# Patient Record
Sex: FEMALE | Race: WHITE | NOT HISPANIC OR LATINO | Employment: OTHER | URBAN - METROPOLITAN AREA
[De-identification: names, ages, dates, MRNs, and addresses within clinical notes are randomized per-mention and may not be internally consistent; named-entity substitution may affect disease eponyms.]

---

## 2017-05-22 ENCOUNTER — APPOINTMENT (OUTPATIENT)
Dept: RADIOLOGY | Facility: MEDICAL CENTER | Age: 42
End: 2017-05-22
Attending: EMERGENCY MEDICINE
Payer: MEDICARE

## 2017-05-22 ENCOUNTER — HOSPITAL ENCOUNTER (EMERGENCY)
Facility: MEDICAL CENTER | Age: 42
End: 2017-05-23
Attending: EMERGENCY MEDICINE
Payer: MEDICARE

## 2017-05-22 DIAGNOSIS — S60.211A CONTUSION OF RIGHT WRIST, INITIAL ENCOUNTER: ICD-10-CM

## 2017-05-22 PROCEDURE — 700102 HCHG RX REV CODE 250 W/ 637 OVERRIDE(OP): Performed by: EMERGENCY MEDICINE

## 2017-05-22 PROCEDURE — 99284 EMERGENCY DEPT VISIT MOD MDM: CPT

## 2017-05-22 PROCEDURE — 73110 X-RAY EXAM OF WRIST: CPT | Mod: RT

## 2017-05-22 PROCEDURE — A9270 NON-COVERED ITEM OR SERVICE: HCPCS | Performed by: EMERGENCY MEDICINE

## 2017-05-22 RX ORDER — OXYCODONE HYDROCHLORIDE AND ACETAMINOPHEN 5; 325 MG/1; MG/1
1 TABLET ORAL ONCE
Status: COMPLETED | OUTPATIENT
Start: 2017-05-22 | End: 2017-05-22

## 2017-05-22 RX ADMIN — OXYCODONE HYDROCHLORIDE AND ACETAMINOPHEN 1 TABLET: 5; 325 TABLET ORAL at 23:25

## 2017-05-22 ASSESSMENT — PAIN SCALES - GENERAL: PAINLEVEL_OUTOF10: 9

## 2017-05-22 NOTE — ED AVS SNAPSHOT
5/22/2017    Deidra Goncalves  [address]  [facility] [address]    Dear Deidra:    [facility] wants to ensure your discharge home is safe and you or your loved ones have had all of your questions answered regarding your care after you leave the hospital.    Below is a list of resources and contact information should you have any questions regarding your hospital stay, follow-up instructions, or active medical symptoms.    Questions or Concerns Regarding… Contact   Medical Questions Related to Your Discharge  (7 days a week, 8am-5pm) Contact a Nurse Care Coordinator   [phone]   Medical Questions Not Related to Your Discharge  (24 hours a day / 7 days a week)  Contact the Nurse Health Line   [phone]    Medications or Discharge Instructions Refer to your discharge packet   or contact your [facility] Primary Care Provider   [phone]   Follow-up Appointment(s) Schedule your appointment via videoNEXT   or contact Scheduling [phone]   Billing Review your statement via videoNEXT  or contact Billing [phone]   Medical Records Review your records via videoNEXT   or contact Medical Records [phone]     You may receive a telephone call within two days of discharge. This call is to make certain you understand your discharge instructions and have the opportunity to have any questions answered. You can also easily access your medical information, test results and upcoming appointments via the videoNEXT free online health management tool. You can learn more and sign up at [employer]/videoNEXT. For assistance setting up your videoNEXT account, please call [phone].    Once again, we want to ensure your discharge home is safe and that you have a clear understanding of any next steps in your care. If you have any questions or concerns, please do not hesitate to contact us, we are here for you. Thank you for choosing [facility] for your healthcare needs.    Sincerely,    Your [facility] Healthcare Team

## 2017-05-22 NOTE — ED AVS SNAPSHOT
[employer] Access Code: [email or web address]  Expires: 6/21/2017 11:57 PM    Your email address is not on file at [facility].  Email Addresses are required for you to sign up for [employer], please contact [phone] to verify your personal information and to provide your email address prior to attempting to register for [employer].    Deidra Goncalves  [address]  [place] [address]    [employer]  A secure, online tool to manage your health information     [employer]’s [employer]® is a secure, online tool that connects you to your personalized health information from the privacy of your home -- day or night - making it very easy for you to manage your healthcare. Once the activation process is completed, you can even access your medical information using the [employer] nicola, which is available for free in the Apple Nicola store or Google Play store.     To learn more about [employer], visit [email or web address]    There are two levels of access available (as shown below):   My Chart Features  [facility] Primary Care Doctor [facility]  Specialists [facility]  Urgent  Care Non-[facility] Primary Care Doctor   Email your healthcare team securely and privately 24/7 X X X    Manage appointments: schedule your next appointment; view details of past/upcoming appointments X      Request prescription refills. X      View recent personal medical records, including lab and immunizations X X X X   View health record, including health history, allergies, medications X X X X   Read reports about your outpatient visits, procedures, consult and ER notes X X X X   See your discharge summary, which is a recap of your hospital and/or ER visit that includes your diagnosis, lab results, and care plan X X  X     How to register for amiandot:  Once your e-mail address has been verified, follow the following steps to sign up for [employer]ot.     1. Go to  [email or web address]  2. Click on the Sign Up Now box, which takes you to the New Member Sign Up  page. You will need to provide the following information:  a. Enter your [employer] Access Code exactly as it appears at the top of this page. (You will not need to use this code after you’ve completed the sign-up process. If you do not sign up before the expiration date, you must request a new code.)   b. Enter your date of birth.   c. Enter your home email address.   d. Click Submit, and follow the next screen’s instructions.  3. Create a [employer] ID. This will be your [employer] login ID and cannot be changed, so think of one that is secure and easy to remember.  4. Create a [employer] password. You can change your password at any time.  5. Enter your Password Reset Question and Answer. This can be used at a later time if you forget your password.   6. Enter your e-mail address. This allows you to receive e-mail notifications when new information is available in [employer].  7. Click Sign Up. You can now view your health information.    For assistance activating your [employer] account, call [phone]

## 2017-05-22 NOTE — ED AVS SNAPSHOT
Home Care Instructions                                                                                                                Deidra Goncalves   MRN: [id number]    Department:  [facility], Emergency Dept   Date of Visit:  5/22/2017            [facility], Emergency Dept    [address]    [place] [address]    Phone:  [phone]      You were seen by     Segundo Coronado M.D.      Your Diagnosis Was     Contusion of right wrist, initial encounter     S60.211A       These are the medications you received during your hospitalization from 05/22/2017 2056 to 05/22/2017 2357     Date/Time Order Dose Route Action    05/22/2017 2320 oxycodone-acetaminophen (PERCOCET) 5-325 MG per tablet 1 Tab 1 Tab Oral Given      Follow-up Information     1. Follow up with [facility] In 3 days.    Contact information    [place] [address] [phone]        Medication Information     Review all of your home medications and newly ordered medications with your primary doctor and/or pharmacist as soon as possible. Follow medication instructions as directed by your doctor and/or pharmacist.     Please keep your complete medication list with you and share with your physician. Update the information when medications are discontinued, doses are changed, or new medications (including over-the-counter products) are added; and carry medication information at all times in the event of emergency situations.               Medication List      ASK your doctor about these medications        Instructions    Morning Afternoon Evening Bedtime    albuterol 2.5 mg/0.5 mL Nebu   Commonly known as:  PROVENTIL        Inhale  by mouth.                        carbamazepine 200 MG Tabs   Commonly known as:  TEGRETOL        Take 200 mg by mouth 2 Times a Day.   Dose:  200 mg                        duloxetine 60 MG Cpep delayed-release capsule   Commonly known as:  CYMBALTA        Take 60 mg by mouth every  day.   Dose:  60 mg                        folic acid 1 MG Tabs   Commonly known as:  FOLVITE        Take 1 mg by mouth every day.   Dose:  1 mg                        gabapentin 300 MG Caps   Commonly known as:  NEURONTIN        Take 800 mg by mouth 3 times a day. Indications: Fibromyalgia Syndrome   Dose:  800 mg                        ibuprofen 800 MG Tabs   Commonly known as:  MOTRIN        Take 800 mg by mouth every 8 hours as needed.   Dose:  800 mg                        MULTIVITAMIN PO        Take  by mouth.                        NEXIUM 40 MG delayed-release capsule   Generic drug:  esomeprazole        Take 40 mg by mouth every morning before breakfast.   Dose:  40 mg                        omeprazole 20 MG delayed-release capsule   Commonly known as:  PRILOSEC        Take 60 mg by mouth every day.   Dose:  60 mg                        sertraline 100 MG Tabs   Commonly known as:  ZOLOFT        Take 200 mg by mouth every day.   Dose:  200 mg                        VOLTAREN 1 % Gel   Generic drug:  Diclofenac Sodium        Apply  to skin as directed.                                Procedures and tests performed during your visit     DX-WRIST-COMPLETE 3+ RIGHT        Discharge Instructions       Contusion  A contusion is a deep bruise. Contusions happen when an injury causes bleeding under the skin. Signs of bruising include pain, puffiness (swelling), and discolored skin. The contusion may turn blue, purple, or yellow.  HOME CARE   · Put ice on the injured area.  ¨ Put ice in a plastic bag.  ¨ Place a towel between your skin and the bag.  ¨ Leave the ice on for 15-20 minutes, 03-04 times a day.  · Only take medicine as told by your doctor.  · Rest the injured area.  · If possible, raise (elevate) the injured area to lessen puffiness.  GET HELP RIGHT AWAY IF:   · You have more bruising or puffiness.  · You have pain that is getting worse.  · Your puffiness or pain is not helped by medicine.  MAKE SURE YOU:    · Understand these instructions.  · Will watch your condition.  · Will get help right away if you are not doing well or get worse.     This information is not intended to replace advice given to you by your health care provider. Make sure you discuss any questions you have with your health care provider.     Document Released: 06/05/2009 Document Revised: 03/11/2013 Document Reviewed: 10/22/2012  [employer] Interactive Patient Education ©2016 [employer]            Patient Information     Patient Information    Following emergency treatment: all patient requiring follow-up care must return either to a private physician or a clinic if your condition worsens before you are able to obtain further medical attention, please return to the emergency room.     Billing Information    At [facility], we work to make the billing process streamlined for our patients.  Our Representatives are here to answer any questions you may have regarding your hospital bill.  If you have insurance coverage and have supplied your insurance information to us, we will submit a claim to your insurer on your behalf.  Should you have any questions regarding your bill, we can be reached online or by phone as follows:  Online: You are able pay your bills online or live chat with our representatives about any billing questions you may have. We are here to help Monday - Friday from 8:00am to 7:30pm and 9:00am - 12:00pm on Saturdays.  Please visit [email or web address]  for more information.   Phone:  [phone] or [phone]    Please note that your emergency physician, surgeon, pathologist, radiologist, anesthesiologist, and other specialists are not employed by [facility] and will therefore bill separately for their services.  Please contact them directly for any questions concerning their bills at the numbers below:     Emergency Physician Services:  [phone]  [facility]:   [phone]  [facility]:  [phone]  [facility]:  [phone]    1. Your final bill may vary from the amount quoted upon discharge if all procedures are not complete at that time, or if your doctor has additional procedures of which we are not aware. You will receive an additional bill if you return to the Emergency Department at [facility] for suture removal regardless of the facility of which the sutures were placed.     2. Please arrange for settlement of this account at the emergency registration.    3. All self-pay accounts are due in full at the time of treatment.  If you are unable to meet this obligation then payment is expected within 4-5 days.     4. If you have had radiology studies (CT, X-ray, Ultrasound, MRI), you have received a preliminary result during your emergency department visit. Please contact the radiology department [phone] to receive a copy of your final result. Please discuss the Final result with your primary physician or with the follow up physician provided.     Crisis Hotline:  [place] Crisis Hotline:  2-697-WIXLWLN or 1-836.463.3158  [place] Crisis Hotline:    1-897.268.1882 or 815-312-1500         ED Discharge Follow Up Questions    1. In order to provide you with very good care, we would like to follow up with a phone call in the next few days.  May we have your permission to contact you?     YES /  NO    2. What is the best phone number to call you? (       )_____-__________    3. What is the best time to call you?      Morning  /  Afternoon  /  Evening                   Patient Signature:  ____________________________________________________________    Date:  ____________________________________________________________

## 2017-05-23 VITALS
BODY MASS INDEX: 27.9 KG/M2 | WEIGHT: 184.08 LBS | OXYGEN SATURATION: 98 % | DIASTOLIC BLOOD PRESSURE: 55 MMHG | HEART RATE: 89 BPM | HEIGHT: 68 IN | TEMPERATURE: 98.8 F | SYSTOLIC BLOOD PRESSURE: 117 MMHG | RESPIRATION RATE: 16 BRPM

## 2017-05-23 ASSESSMENT — PAIN SCALES - GENERAL: PAINLEVEL_OUTOF10: 5

## 2017-05-23 NOTE — DISCHARGE INSTRUCTIONS
Contusion  A contusion is a deep bruise. Contusions happen when an injury causes bleeding under the skin. Signs of bruising include pain, puffiness (swelling), and discolored skin. The contusion may turn blue, purple, or yellow.  HOME CARE   · Put ice on the injured area.  ¨ Put ice in a plastic bag.  ¨ Place a towel between your skin and the bag.  ¨ Leave the ice on for 15-20 minutes, 03-04 times a day.  · Only take medicine as told by your doctor.  · Rest the injured area.  · If possible, raise (elevate) the injured area to lessen puffiness.  GET HELP RIGHT AWAY IF:   · You have more bruising or puffiness.  · You have pain that is getting worse.  · Your puffiness or pain is not helped by medicine.  MAKE SURE YOU:   · Understand these instructions.  · Will watch your condition.  · Will get help right away if you are not doing well or get worse.     This information is not intended to replace advice given to you by your health care provider. Make sure you discuss any questions you have with your health care provider.     Document Released: 06/05/2009 Document Revised: 03/11/2013 Document Reviewed: 10/22/2012  [employer] Interactive Patient Education ©2016 [employer] Inc.

## 2017-05-23 NOTE — ED PROVIDER NOTES
"ED Provider Note    CHIEF COMPLAINT  Chief Complaint   Patient presents with   • Wrist Injury     right wrist \"slammed\" on table 12 days ago. Pt able to move fingers, denies n/t, pain radiates up to elbow.       HPI  Deidra Goncalves is a 42 y.o. female who presents to the emergency department with right wrist discomfort. The patient states she slammed her forearm down on a table approximately 12 days ago and she's had significant discomfort around the distal ulnar aspect of her wrist. She did have x-rays performed at [facility] right after the event that was negative. She has continued discomfort despite treatment. She says she does have some pain that rates up into the elbow. She does not have any loss of function of the hand but does have severe pain with flexion and extension at the wrist.    REVIEW OF SYSTEMS  Recent fevers    PHYSICAL EXAM  VITAL SIGNS: /49 mmHg  Pulse 94  Temp(Src) 37.1 °C (98.8 °F)  Resp 18  Ht 1.727 m (5' 8\")  Wt 83.5 kg (184 lb 1.4 oz)  BMI 28.00 kg/m2  SpO2 99%  In general the patient does not appear toxic  Extremities patient has soft tissue swelling and tenderness to the ulnar aspect of the right wrist. She does have some surrounding ecchymosis. She does not have any obvious skeletal deformities. She has full flexion and extension at the wrist as well as the MCP and IP joints. She has a normal elbow examination.  Skin ecchymosis to the ulnar aspect of the wrist as described above  Neurovascular examination is grossly intact to the right upper extremity    RADIOLOGY/PROCEDURES  DX-WRIST-COMPLETE 3+ RIGHT   Final Result      1.  Soft tissue swelling at the dorsal and ulnar aspect of RIGHT wrist with possible soft tissue gas and bony erosion at the base of 5th metacarpal, concerning for infectious and/or inflammatory arthropathy.   2.  No fracture or dislocation.            COURSE & MEDICAL DECISION MAKING  Pertinent Labs & Imaging studies reviewed. (See chart for " details)  This a 42-year-old female who presents to emergency department with right wrist discomfort. I do not appreciate any evidence of an infectious source. I suspect the x-ray does show acute inflammation. She has not responded to anti-inflammatories and therefore place the patient in a wrist splint and have her follow-up with orthopedics in 48-72 hours.    FINAL IMPRESSION  1. Right wrist contusion       Disposition  The patient will be discharged in stable condition      Electronically signed by: Segundo Coronado, 5/22/2017 10:55 PM

## 2017-05-23 NOTE — ED NOTES
Initial injury 12 days ago, ace wrap removed from RUE c/o pain to R hand/wrist, no obvious deformity, no swelling noted, bruising noted to hand/wrist,  CMS intact, radial pulses 2+ bilat, assessment as charted.

## 2017-05-23 NOTE — ED NOTES
"Patient ambulatory to triage:  Chief Complaint   Patient presents with   • Wrist Injury     right wrist \"slammed\" on table 12 days ago. Pt able to move fingers, denies n/t, pain radiates up to elbow.     Constant throbbing pain to right wrist, radiating to elbow.      Explained wait time and triage process to pt. Pt placed back out in lobby, told to notify ED tech or triage RN of any changes.       "

## 2017-05-23 NOTE — ED NOTES
Patient appropriate for discharge per ERP. Discussed discharge instructions, home care, and follow up with patient. Patient verbalized understanding. No distress noted.   Family providing ride home.

## 2019-09-08 ENCOUNTER — HOSPITAL ENCOUNTER (OUTPATIENT)
Facility: MEDICAL CENTER | Age: 44
DRG: 872 | End: 2019-09-08
Admitting: FAMILY MEDICINE
Payer: MEDICARE

## 2019-09-08 ENCOUNTER — ANESTHESIA (OUTPATIENT)
Dept: SURGERY | Facility: MEDICAL CENTER | Age: 44
DRG: 872 | End: 2019-09-08
Payer: MEDICARE

## 2019-09-08 ENCOUNTER — APPOINTMENT (OUTPATIENT)
Dept: RADIOLOGY | Facility: MEDICAL CENTER | Age: 44
DRG: 872 | End: 2019-09-08
Attending: UROLOGY
Payer: MEDICARE

## 2019-09-08 ENCOUNTER — HOSPITAL ENCOUNTER (INPATIENT)
Facility: MEDICAL CENTER | Age: 44
LOS: 1 days | DRG: 872 | End: 2019-09-09
Attending: FAMILY MEDICINE | Admitting: FAMILY MEDICINE
Payer: MEDICARE

## 2019-09-08 ENCOUNTER — ANESTHESIA EVENT (OUTPATIENT)
Dept: SURGERY | Facility: MEDICAL CENTER | Age: 44
DRG: 872 | End: 2019-09-08
Payer: MEDICARE

## 2019-09-08 PROBLEM — R78.81 GRAM-NEGATIVE BACTEREMIA: Status: ACTIVE | Noted: 2019-09-08

## 2019-09-08 PROBLEM — F99 PSYCHIATRIC DISORDER: Status: ACTIVE | Noted: 2019-09-08

## 2019-09-08 PROBLEM — Z72.0 TOBACCO USE: Status: ACTIVE | Noted: 2019-09-08

## 2019-09-08 PROBLEM — F19.10 POLYSUBSTANCE ABUSE (HCC): Status: ACTIVE | Noted: 2019-09-08

## 2019-09-08 PROBLEM — N12 PYELONEPHRITIS: Status: ACTIVE | Noted: 2019-09-08

## 2019-09-08 PROBLEM — Z78.9 ALCOHOL USE: Status: ACTIVE | Noted: 2019-09-08

## 2019-09-08 PROBLEM — E87.6 HYPOKALEMIA: Status: ACTIVE | Noted: 2019-09-08

## 2019-09-08 PROBLEM — R56.9 SEIZURE (HCC): Status: ACTIVE | Noted: 2019-09-08

## 2019-09-08 PROBLEM — N13.2 URETERAL STONE WITH HYDRONEPHROSIS: Status: ACTIVE | Noted: 2019-09-08

## 2019-09-08 PROBLEM — A41.9 SEPSIS (HCC): Status: ACTIVE | Noted: 2019-09-08

## 2019-09-08 LAB
ANION GAP SERPL CALC-SCNC: 10 MMOL/L (ref 0–11.9)
BUN SERPL-MCNC: 14 MG/DL (ref 8–22)
CALCIUM SERPL-MCNC: 7.9 MG/DL (ref 8.5–10.5)
CHLORIDE SERPL-SCNC: 106 MMOL/L (ref 96–112)
CO2 SERPL-SCNC: 21 MMOL/L (ref 20–33)
CREAT SERPL-MCNC: 0.78 MG/DL (ref 0.5–1.4)
GLUCOSE SERPL-MCNC: 159 MG/DL (ref 65–99)
INR PPP: 1.34 (ref 0.87–1.13)
LACTATE BLD-SCNC: 1.7 MMOL/L (ref 0.5–2)
LACTATE BLD-SCNC: 2.1 MMOL/L (ref 0.5–2)
LACTATE BLD-SCNC: 2.7 MMOL/L (ref 0.5–2)
MAGNESIUM SERPL-MCNC: 1.8 MG/DL (ref 1.5–2.5)
PHOSPHATE SERPL-MCNC: 2.6 MG/DL (ref 2.5–4.5)
POTASSIUM SERPL-SCNC: 3.3 MMOL/L (ref 3.6–5.5)
PROTHROMBIN TIME: 16.9 SEC (ref 12–14.6)
SODIUM SERPL-SCNC: 137 MMOL/L (ref 135–145)

## 2019-09-08 PROCEDURE — 500879 HCHG PACK, CYSTO: Performed by: UROLOGY

## 2019-09-08 PROCEDURE — 160036 HCHG PACU - EA ADDL 30 MINS PHASE I: Performed by: UROLOGY

## 2019-09-08 PROCEDURE — 700117 HCHG RX CONTRAST REV CODE 255: Performed by: UROLOGY

## 2019-09-08 PROCEDURE — 700111 HCHG RX REV CODE 636 W/ 250 OVERRIDE (IP): Performed by: ANESTHESIOLOGY

## 2019-09-08 PROCEDURE — 85027 COMPLETE CBC AUTOMATED: CPT

## 2019-09-08 PROCEDURE — 83735 ASSAY OF MAGNESIUM: CPT

## 2019-09-08 PROCEDURE — 85610 PROTHROMBIN TIME: CPT

## 2019-09-08 PROCEDURE — 85007 BL SMEAR W/DIFF WBC COUNT: CPT

## 2019-09-08 PROCEDURE — 160002 HCHG RECOVERY MINUTES (STAT): Performed by: UROLOGY

## 2019-09-08 PROCEDURE — 0TJ58ZZ INSPECTION OF KIDNEY, VIA NATURAL OR ARTIFICIAL OPENING ENDOSCOPIC: ICD-10-PCS | Performed by: UROLOGY

## 2019-09-08 PROCEDURE — 160048 HCHG OR STATISTICAL LEVEL 1-5: Performed by: UROLOGY

## 2019-09-08 PROCEDURE — 700111 HCHG RX REV CODE 636 W/ 250 OVERRIDE (IP): Performed by: FAMILY MEDICINE

## 2019-09-08 PROCEDURE — 99223 1ST HOSP IP/OBS HIGH 75: CPT | Mod: AI | Performed by: FAMILY MEDICINE

## 2019-09-08 PROCEDURE — 84100 ASSAY OF PHOSPHORUS: CPT

## 2019-09-08 PROCEDURE — 770001 HCHG ROOM/CARE - MED/SURG/GYN PRIV*

## 2019-09-08 PROCEDURE — 160039 HCHG SURGERY MINUTES - EA ADDL 1 MIN LEVEL 3: Performed by: UROLOGY

## 2019-09-08 PROCEDURE — 700101 HCHG RX REV CODE 250: Performed by: ANESTHESIOLOGY

## 2019-09-08 PROCEDURE — 160035 HCHG PACU - 1ST 60 MINS PHASE I: Performed by: UROLOGY

## 2019-09-08 PROCEDURE — 160028 HCHG SURGERY MINUTES - 1ST 30 MINS LEVEL 3: Performed by: UROLOGY

## 2019-09-08 PROCEDURE — C1758 CATHETER, URETERAL: HCPCS | Performed by: UROLOGY

## 2019-09-08 PROCEDURE — 501329 HCHG SET, CYSTO IRRIG Y TUR: Performed by: UROLOGY

## 2019-09-08 PROCEDURE — 36415 COLL VENOUS BLD VENIPUNCTURE: CPT

## 2019-09-08 PROCEDURE — 700102 HCHG RX REV CODE 250 W/ 637 OVERRIDE(OP): Performed by: FAMILY MEDICINE

## 2019-09-08 PROCEDURE — 80048 BASIC METABOLIC PNL TOTAL CA: CPT | Mod: 91

## 2019-09-08 PROCEDURE — 700105 HCHG RX REV CODE 258: Performed by: FAMILY MEDICINE

## 2019-09-08 PROCEDURE — 160009 HCHG ANES TIME/MIN: Performed by: UROLOGY

## 2019-09-08 PROCEDURE — 83605 ASSAY OF LACTIC ACID: CPT

## 2019-09-08 PROCEDURE — A9270 NON-COVERED ITEM OR SERVICE: HCPCS | Performed by: FAMILY MEDICINE

## 2019-09-08 RX ORDER — SODIUM CHLORIDE, SODIUM LACTATE, POTASSIUM CHLORIDE, AND CALCIUM CHLORIDE .6; .31; .03; .02 G/100ML; G/100ML; G/100ML; G/100ML
30 INJECTION, SOLUTION INTRAVENOUS
Status: DISCONTINUED | OUTPATIENT
Start: 2019-09-08 | End: 2019-09-10 | Stop reason: HOSPADM

## 2019-09-08 RX ORDER — KETOROLAC TROMETHAMINE 30 MG/ML
30 INJECTION, SOLUTION INTRAMUSCULAR; INTRAVENOUS EVERY 6 HOURS PRN
Status: DISCONTINUED | OUTPATIENT
Start: 2019-09-08 | End: 2019-09-10 | Stop reason: HOSPADM

## 2019-09-08 RX ORDER — HALOPERIDOL 5 MG/ML
1 INJECTION INTRAMUSCULAR
Status: DISCONTINUED | OUTPATIENT
Start: 2019-09-08 | End: 2019-09-08 | Stop reason: HOSPADM

## 2019-09-08 RX ORDER — SODIUM CHLORIDE 9 MG/ML
INJECTION, SOLUTION INTRAVENOUS
Status: COMPLETED
Start: 2019-09-08 | End: 2019-09-08

## 2019-09-08 RX ORDER — BUPROPION HYDROCHLORIDE 150 MG/1
150 TABLET, EXTENDED RELEASE ORAL 2 TIMES DAILY
Status: DISCONTINUED | OUTPATIENT
Start: 2019-09-08 | End: 2019-09-10 | Stop reason: HOSPADM

## 2019-09-08 RX ORDER — ACETAMINOPHEN 325 MG/1
650 TABLET ORAL EVERY 6 HOURS PRN
Status: DISCONTINUED | OUTPATIENT
Start: 2019-09-08 | End: 2019-09-10 | Stop reason: HOSPADM

## 2019-09-08 RX ORDER — ONDANSETRON 2 MG/ML
INJECTION INTRAMUSCULAR; INTRAVENOUS PRN
Status: DISCONTINUED | OUTPATIENT
Start: 2019-09-08 | End: 2019-09-08 | Stop reason: SURG

## 2019-09-08 RX ORDER — GABAPENTIN 400 MG/1
800 CAPSULE ORAL 3 TIMES DAILY
Status: DISCONTINUED | OUTPATIENT
Start: 2019-09-08 | End: 2019-09-10 | Stop reason: HOSPADM

## 2019-09-08 RX ORDER — MORPHINE SULFATE 4 MG/ML
2-4 INJECTION, SOLUTION INTRAMUSCULAR; INTRAVENOUS
Status: DISCONTINUED | OUTPATIENT
Start: 2019-09-08 | End: 2019-09-08

## 2019-09-08 RX ORDER — HYDROMORPHONE HYDROCHLORIDE 1 MG/ML
0.2 INJECTION, SOLUTION INTRAMUSCULAR; INTRAVENOUS; SUBCUTANEOUS
Status: DISCONTINUED | OUTPATIENT
Start: 2019-09-08 | End: 2019-09-08 | Stop reason: HOSPADM

## 2019-09-08 RX ORDER — LIDOCAINE HYDROCHLORIDE 20 MG/ML
INJECTION, SOLUTION EPIDURAL; INFILTRATION; INTRACAUDAL; PERINEURAL PRN
Status: DISCONTINUED | OUTPATIENT
Start: 2019-09-08 | End: 2019-09-08 | Stop reason: SURG

## 2019-09-08 RX ORDER — MIDAZOLAM HYDROCHLORIDE 1 MG/ML
INJECTION INTRAMUSCULAR; INTRAVENOUS PRN
Status: DISCONTINUED | OUTPATIENT
Start: 2019-09-08 | End: 2019-09-08 | Stop reason: SURG

## 2019-09-08 RX ORDER — BISACODYL 10 MG
10 SUPPOSITORY, RECTAL RECTAL
Status: DISCONTINUED | OUTPATIENT
Start: 2019-09-08 | End: 2019-09-10 | Stop reason: HOSPADM

## 2019-09-08 RX ORDER — ALBUTEROL SULFATE 90 UG/1
1-2 AEROSOL, METERED RESPIRATORY (INHALATION) EVERY 4 HOURS PRN
Status: DISCONTINUED | OUTPATIENT
Start: 2019-09-08 | End: 2019-09-10 | Stop reason: HOSPADM

## 2019-09-08 RX ORDER — MIDAZOLAM HYDROCHLORIDE 1 MG/ML
1 INJECTION INTRAMUSCULAR; INTRAVENOUS
Status: DISCONTINUED | OUTPATIENT
Start: 2019-09-08 | End: 2019-09-08 | Stop reason: HOSPADM

## 2019-09-08 RX ORDER — LABETALOL HYDROCHLORIDE 5 MG/ML
5 INJECTION, SOLUTION INTRAVENOUS
Status: DISCONTINUED | OUTPATIENT
Start: 2019-09-08 | End: 2019-09-08 | Stop reason: HOSPADM

## 2019-09-08 RX ORDER — OXYCODONE HYDROCHLORIDE 5 MG/1
5-10 TABLET ORAL EVERY 4 HOURS PRN
Status: DISCONTINUED | OUTPATIENT
Start: 2019-09-08 | End: 2019-09-08

## 2019-09-08 RX ORDER — SODIUM CHLORIDE, SODIUM LACTATE, POTASSIUM CHLORIDE, CALCIUM CHLORIDE 600; 310; 30; 20 MG/100ML; MG/100ML; MG/100ML; MG/100ML
INJECTION, SOLUTION INTRAVENOUS CONTINUOUS
Status: DISCONTINUED | OUTPATIENT
Start: 2019-09-08 | End: 2019-09-10 | Stop reason: HOSPADM

## 2019-09-08 RX ORDER — ONDANSETRON 2 MG/ML
4 INJECTION INTRAMUSCULAR; INTRAVENOUS
Status: DISCONTINUED | OUTPATIENT
Start: 2019-09-08 | End: 2019-09-08 | Stop reason: HOSPADM

## 2019-09-08 RX ORDER — HYDROMORPHONE HYDROCHLORIDE 1 MG/ML
0.1 INJECTION, SOLUTION INTRAMUSCULAR; INTRAVENOUS; SUBCUTANEOUS
Status: DISCONTINUED | OUTPATIENT
Start: 2019-09-08 | End: 2019-09-08 | Stop reason: HOSPADM

## 2019-09-08 RX ORDER — OXYCODONE HCL 5 MG/5 ML
10 SOLUTION, ORAL ORAL
Status: DISCONTINUED | OUTPATIENT
Start: 2019-09-08 | End: 2019-09-08 | Stop reason: HOSPADM

## 2019-09-08 RX ORDER — DIPHENHYDRAMINE HYDROCHLORIDE 50 MG/ML
12.5 INJECTION INTRAMUSCULAR; INTRAVENOUS
Status: DISCONTINUED | OUTPATIENT
Start: 2019-09-08 | End: 2019-09-08 | Stop reason: HOSPADM

## 2019-09-08 RX ORDER — POLYETHYLENE GLYCOL 3350 17 G/17G
1 POWDER, FOR SOLUTION ORAL
Status: DISCONTINUED | OUTPATIENT
Start: 2019-09-08 | End: 2019-09-10 | Stop reason: HOSPADM

## 2019-09-08 RX ORDER — IPRATROPIUM BROMIDE AND ALBUTEROL SULFATE 2.5; .5 MG/3ML; MG/3ML
3 SOLUTION RESPIRATORY (INHALATION)
Status: DISCONTINUED | OUTPATIENT
Start: 2019-09-08 | End: 2019-09-08 | Stop reason: HOSPADM

## 2019-09-08 RX ORDER — DEXAMETHASONE SODIUM PHOSPHATE 4 MG/ML
INJECTION, SOLUTION INTRA-ARTICULAR; INTRALESIONAL; INTRAMUSCULAR; INTRAVENOUS; SOFT TISSUE PRN
Status: DISCONTINUED | OUTPATIENT
Start: 2019-09-08 | End: 2019-09-08 | Stop reason: SURG

## 2019-09-08 RX ORDER — OXYCODONE HCL 5 MG/5 ML
5 SOLUTION, ORAL ORAL
Status: DISCONTINUED | OUTPATIENT
Start: 2019-09-08 | End: 2019-09-08 | Stop reason: HOSPADM

## 2019-09-08 RX ORDER — HYDRALAZINE HYDROCHLORIDE 20 MG/ML
5 INJECTION INTRAMUSCULAR; INTRAVENOUS
Status: DISCONTINUED | OUTPATIENT
Start: 2019-09-08 | End: 2019-09-08 | Stop reason: HOSPADM

## 2019-09-08 RX ORDER — SODIUM CHLORIDE, SODIUM LACTATE, POTASSIUM CHLORIDE, CALCIUM CHLORIDE 600; 310; 30; 20 MG/100ML; MG/100ML; MG/100ML; MG/100ML
INJECTION, SOLUTION INTRAVENOUS CONTINUOUS
Status: DISCONTINUED | OUTPATIENT
Start: 2019-09-08 | End: 2019-09-08 | Stop reason: HOSPADM

## 2019-09-08 RX ORDER — CEFAZOLIN SODIUM 1 G/3ML
INJECTION, POWDER, FOR SOLUTION INTRAMUSCULAR; INTRAVENOUS PRN
Status: DISCONTINUED | OUTPATIENT
Start: 2019-09-08 | End: 2019-09-08 | Stop reason: SURG

## 2019-09-08 RX ORDER — CARBAMAZEPINE 200 MG/1
200 TABLET ORAL 2 TIMES DAILY
Status: DISCONTINUED | OUTPATIENT
Start: 2019-09-08 | End: 2019-09-10 | Stop reason: HOSPADM

## 2019-09-08 RX ORDER — AMOXICILLIN 250 MG
2 CAPSULE ORAL 2 TIMES DAILY
Status: DISCONTINUED | OUTPATIENT
Start: 2019-09-08 | End: 2019-09-10 | Stop reason: HOSPADM

## 2019-09-08 RX ORDER — HYDROMORPHONE HYDROCHLORIDE 1 MG/ML
0.4 INJECTION, SOLUTION INTRAMUSCULAR; INTRAVENOUS; SUBCUTANEOUS
Status: DISCONTINUED | OUTPATIENT
Start: 2019-09-08 | End: 2019-09-08 | Stop reason: HOSPADM

## 2019-09-08 RX ORDER — MEPERIDINE HYDROCHLORIDE 25 MG/ML
12.5 INJECTION INTRAMUSCULAR; INTRAVENOUS; SUBCUTANEOUS
Status: DISCONTINUED | OUTPATIENT
Start: 2019-09-08 | End: 2019-09-08 | Stop reason: HOSPADM

## 2019-09-08 RX ADMIN — CEFTRIAXONE SODIUM 2 G: 2 INJECTION, POWDER, FOR SOLUTION INTRAMUSCULAR; INTRAVENOUS at 17:56

## 2019-09-08 RX ADMIN — ONDANSETRON 4 MG: 2 INJECTION INTRAMUSCULAR; INTRAVENOUS at 14:30

## 2019-09-08 RX ADMIN — GABAPENTIN 800 MG: 400 CAPSULE ORAL at 18:44

## 2019-09-08 RX ADMIN — CARBAMAZEPINE 200 MG: 200 TABLET ORAL at 18:44

## 2019-09-08 RX ADMIN — DEXAMETHASONE SODIUM PHOSPHATE 8 MG: 4 INJECTION, SOLUTION INTRA-ARTICULAR; INTRALESIONAL; INTRAMUSCULAR; INTRAVENOUS; SOFT TISSUE at 14:30

## 2019-09-08 RX ADMIN — PROPOFOL 120 MG: 10 INJECTION, EMULSION INTRAVENOUS at 14:30

## 2019-09-08 RX ADMIN — MIDAZOLAM 2 MG: 1 INJECTION INTRAMUSCULAR; INTRAVENOUS at 14:23

## 2019-09-08 RX ADMIN — FENTANYL CITRATE 50 MCG: 50 INJECTION, SOLUTION INTRAMUSCULAR; INTRAVENOUS at 14:27

## 2019-09-08 RX ADMIN — CEFAZOLIN 2 G: 330 INJECTION, POWDER, FOR SOLUTION INTRAMUSCULAR; INTRAVENOUS at 14:30

## 2019-09-08 RX ADMIN — SENNOSIDES, DOCUSATE SODIUM 2 TABLET: 50; 8.6 TABLET, FILM COATED ORAL at 18:43

## 2019-09-08 RX ADMIN — SODIUM CHLORIDE, POTASSIUM CHLORIDE, SODIUM LACTATE AND CALCIUM CHLORIDE: 600; 310; 30; 20 INJECTION, SOLUTION INTRAVENOUS at 14:23

## 2019-09-08 RX ADMIN — KETOROLAC TROMETHAMINE 30 MG: 30 INJECTION, SOLUTION INTRAMUSCULAR at 12:51

## 2019-09-08 RX ADMIN — GABAPENTIN 800 MG: 400 CAPSULE ORAL at 12:10

## 2019-09-08 RX ADMIN — FENTANYL CITRATE 50 MCG: 50 INJECTION, SOLUTION INTRAMUSCULAR; INTRAVENOUS at 14:51

## 2019-09-08 RX ADMIN — SODIUM CHLORIDE, POTASSIUM CHLORIDE, SODIUM LACTATE AND CALCIUM CHLORIDE: 600; 310; 30; 20 INJECTION, SOLUTION INTRAVENOUS at 17:56

## 2019-09-08 RX ADMIN — SODIUM CHLORIDE, POTASSIUM CHLORIDE, SODIUM LACTATE AND CALCIUM CHLORIDE: 600; 310; 30; 20 INJECTION, SOLUTION INTRAVENOUS at 12:10

## 2019-09-08 RX ADMIN — BUPROPION HYDROCHLORIDE 150 MG: 150 TABLET, EXTENDED RELEASE ORAL at 18:44

## 2019-09-08 RX ADMIN — LIDOCAINE HYDROCHLORIDE 5 ML: 20 INJECTION, SOLUTION EPIDURAL; INFILTRATION; INTRACAUDAL at 14:30

## 2019-09-08 ASSESSMENT — ENCOUNTER SYMPTOMS
COUGH: 0
NECK PAIN: 0
NAUSEA: 1
FEVER: 0
FLANK PAIN: 1
HEARTBURN: 0
DIZZINESS: 0
BACK PAIN: 0
DIARRHEA: 0
SHORTNESS OF BREATH: 0
BLURRED VISION: 0
CHILLS: 1
NERVOUS/ANXIOUS: 1
DIAPHORESIS: 0
PALPITATIONS: 0
SORE THROAT: 0
VOMITING: 0
HEADACHES: 0
ABDOMINAL PAIN: 0

## 2019-09-08 ASSESSMENT — LIFESTYLE VARIABLES
TOTAL SCORE: 0
ALCOHOL_USE: NO
ON A TYPICAL DAY WHEN YOU DRINK ALCOHOL HOW MANY DRINKS DO YOU HAVE: 0
HAVE PEOPLE ANNOYED YOU BY CRITICIZING YOUR DRINKING: NO
EVER HAD A DRINK FIRST THING IN THE MORNING TO STEADY YOUR NERVES TO GET RID OF A HANGOVER: NO
EVER FELT BAD OR GUILTY ABOUT YOUR DRINKING: NO
AVERAGE NUMBER OF DAYS PER WEEK YOU HAVE A DRINK CONTAINING ALCOHOL: 0
TOTAL SCORE: 0
EVER_SMOKED: YES
HOW MANY TIMES IN THE PAST YEAR HAVE YOU HAD 5 OR MORE DRINKS IN A DAY: 0
TOTAL SCORE: 0
CONSUMPTION TOTAL: NEGATIVE
DOES PATIENT WANT TO STOP DRINKING: NO
HAVE YOU EVER FELT YOU SHOULD CUT DOWN ON YOUR DRINKING: NO

## 2019-09-08 ASSESSMENT — COGNITIVE AND FUNCTIONAL STATUS - GENERAL
MOBILITY SCORE: 23
DAILY ACTIVITIY SCORE: 24
CLIMB 3 TO 5 STEPS WITH RAILING: A LITTLE
SUGGESTED CMS G CODE MODIFIER MOBILITY: CI
SUGGESTED CMS G CODE MODIFIER DAILY ACTIVITY: CH

## 2019-09-08 ASSESSMENT — PATIENT HEALTH QUESTIONNAIRE - PHQ9
2. FEELING DOWN, DEPRESSED, IRRITABLE, OR HOPELESS: NOT AT ALL
SUM OF ALL RESPONSES TO PHQ9 QUESTIONS 1 AND 2: 0
1. LITTLE INTEREST OR PLEASURE IN DOING THINGS: NOT AT ALL

## 2019-09-08 ASSESSMENT — PAIN SCALES - GENERAL: PAIN_LEVEL: 2

## 2019-09-08 NOTE — OP REPORT
OPERATIVE NOTE:      Date: 9/8/2019    Patient ID:   Name:             Deidra Go   YOB: 1975  Age:                 44 y.o.  female   MRN:               [id number]                                                                         PRE-OP DIAGNOSIS: 4mm distal left stone        POST-OP DIAGNOSIS: no remaining stone            PROCEDURE: Procedure(s) and Anesthesia Type:     * CYSTOSCOPY,  * URETEROSCOPY             SURGEON: Surgeon(s) and Role:     * Fransisco Reece M.D. - Primary            ANESTHESIA: get            ESTIMATED BLOOD LOSS: none            DRAINS: none                  SPECIMENS: none               COMPLICATIONS: none                   CONDITION: stabl            TECHNIQUE:  The patient was brought to the operating room. Given general anesthesia. Prepped and draped in the usual sterile fashion. Cystoscopy was carried out. Left ureteral orifices identified. Sensor wire passed through the orifice to the kidney. 6.9 Uzbek rigid ureteroscope was passed alongside the wire. No stone was identified. Contrast is used to opacify the proximal system. No filling defect is seen. Over the wire then passed a flexible scope. All 3 calyceal groups are viewed with no stones found. Ureter then examined all the way to bladder with no stone. No left usual stent is placed. Cystoscopy  was then performed. She was sent to recovery in stable condition.

## 2019-09-08 NOTE — ANESTHESIA PROCEDURE NOTES
Airway  Date/Time: 9/8/2019 2:31 PM  Performed by: Mert Arana M.D.  Authorized by: Mert Arana M.D.     Location:  OR  Urgency:  Elective  Difficult Airway: No    Indications for Airway Management:  Anesthesia  Spontaneous Ventilation: absent    Sedation Level:  Deep  Preoxygenated: Yes    Mask Difficulty Assessment:  1 - vent by mask  Final Airway Type:  Supraglottic airway  Final Supraglottic Airway:  Standard LMA  SGA Size:  4  Number of Attempts at Approach:  1

## 2019-09-08 NOTE — OR NURSING
Patient sleeping; aroused spontaneously; cooperative. O2Sat 94% on room air, VSS. Unable to locate Pt's boyfriend at this time.

## 2019-09-08 NOTE — ANESTHESIA TIME REPORT
Anesthesia Start and Stop Event Times     Date Time Event    9/8/2019 1410 Ready for Procedure     1423 Anesthesia Start     1458 Anesthesia Stop        Responsible Staff  09/08/19    Name Role Begin End    Mert Arana M.D. Anesth 1423 1458        Preop Diagnosis (Free Text):  Pre-op Diagnosis     kidney stones        Preop Diagnosis (Codes):    Post op Diagnosis  Renal stones      Premium Reason  E. Weekend    Comments:

## 2019-09-08 NOTE — OR NURSING
Pt to pre-op. Pre-op complete. RNs Jeanine and Beth assisting to prep pt for surgery. Pt pleasant and cooperative.   1340- Dr. Reece at bedside speaking with patient, Beth RN at bedside while Dr. Reece in room.

## 2019-09-08 NOTE — PROGRESS NOTES
"1100: Patient arrived to T7-2 via medical transport. Patient introduced to this RN, ALTAGRACIA May and AYLIN Mcrae at bedside. Patient alert and oriented but lethargic at times. Patient escorted to restroom via this RN and ALTAGRACIA May. Patient assisted per pt request to help change into renown gown, pants and yellow socks. All patients belongings (1 pair blue jeans, 1 pair black leggings with marijuana leafs, grey soft winter boots, and 2 mis matched socks.) placed in belongings bag and placed at bedside per pt request. Patient escorted back to bed. Vitals taken at this time. Patient introduced to floor. Fall precautions in place, safety maintained, bed alarm on and call light within reach.     1105: MD Nina paged to notify patient has arrived.     1115: This RN at bedside with AYLIN Mcrae attempting to complete admission. During admission process patient given information about floor policies and because of non private room patient unable to have visitors during 8pm to 5am per hospital policy. At this time patient became agitated and aggressive stating \"why the hell did they transfer me here then, I want to be transferred to [place], I need a private room, can't I just ask my neighbor/roomate if its okay?, He's homeless (referring to her boyfriend), We don't even have our car to stay in. At this time this RN apologized, and explained there are no private rooms available at this time per charge RN Earnestine and DIVINA Lee. This RN also explained that we can not medically transfer because we have proper urologist on staff to treat patient so there is not a medical need. This RN offered for a list of homeless shelters in the area to patient for boyfriend. Patient stated \"F**k No!, He will not go to a shelter, they're n**ers there! They stole all of our stuff! [place] has really turned into a s**t place.\" This RN then explained she is welcome to leave AMA as well but we can not make exceptions to this policy. This RN then " "stated she will talk to charge RN Earnestine again and will try to contact case management to help.     1125: MD Nina at bedside, with AYLIN Mcrae.     1145: This RN at bedside with charge ALTAGRACIA Colby. Charge RN Earnestine attempted to find a private bed again but unable at this time. Charge RN Earnestine gave patient [facility] number per pt request. Charge RN also reiterated hospital policy. Patient stated \"I will make a decision in a little while.\" This RN reiterated need for patient to be hospitalized. MD Nina notified.    1200:  Shaye notified of situation to see if she could assist patient/ patients boyfriend. Shaye mentioned we can only provide a list of shelters at this time.     1320: Patient transferred to OR via transport at this time.    1550: Report called the [facility] T4 RN at this time, All belongings walked down to room T402 at this time and handed to floor RN.           "

## 2019-09-08 NOTE — PROGRESS NOTES
Transfer from [facility], Sepsis, Pyelonephritis, Ureterolithiasis, Urology [facility] consulted

## 2019-09-08 NOTE — ASSESSMENT & PLAN NOTE
Urology consulted POD 1  CYSTOSCOPY,  * URETEROSCOPY   On IV ceftriaxone  Follow culture  Pain control with toradol

## 2019-09-08 NOTE — ANESTHESIA POSTPROCEDURE EVALUATION
Patient: Deidra Go    Procedure Summary     Date:  09/08/19 Room / Location:  [place] [address] / SURGERY [facility]    Anesthesia Start:  1423 Anesthesia Stop:  1458    Procedures:       CYSTOSCOPY (Urethra)      URETEROSCOPY (Left Ureter) Diagnosis:  (kidney stones)    Surgeon:  Fransisco Reece M.D. Responsible Provider:  Mert Arana M.D.    Anesthesia Type:  general ASA Status:  3 - Emergent          Final Anesthesia Type: general  Last vitals  BP   Blood Pressure: (!) 96/69    Temp   36.9 °C (98.4 °F)    Pulse   Pulse: 95   Resp   15    SpO2   97 %      Anesthesia Post Evaluation    Patient location during evaluation: PACU  Patient participation: complete - patient participated  Level of consciousness: awake and alert  Pain score: 2    Airway patency: patent  Anesthetic complications: no  Cardiovascular status: adequate  Respiratory status: acceptable  Hydration status: acceptable    PONV: none           Nurse Pain Score: 0 (NPRS)

## 2019-09-08 NOTE — ASSESSMENT & PLAN NOTE
This is sepsis (without associated acute organ dysfunction).  Source -pyelonephritis  Sepsis protocol  IV ceftriaxone  Follow culture

## 2019-09-08 NOTE — PROGRESS NOTES
2 RN skin check completed with Lalo FRIAS.   Devices in place None.  Skin assessed under devices N/A.  Confirmed pressure ulcers found on None.  New potential pressure ulcers noted on N/A. Wound consult placed N/A.  The following interventions in place Q2hr self turns, moistures, and pillows for positioning.

## 2019-09-08 NOTE — ANESTHESIA PREPROCEDURE EVALUATION
"44yoF with PMHx of substance abuse (meth/heroin) /ETOH, tobacco abuse, bipolar do presenting for sepsis due to ureteral stone    Ac-- pt says +N/V, prolonged wakeup, and \"flat lined once bc she wasn't taking her meds that she was supposed to and they thought she was\" - pt not sure what exactly happened    Allergies to PCN, trazadone    Appropriately NPO      Relevant Problems   NEURO   (+) Seizure (HCC)         (+) Ureteral stone with hydronephrosis       Physical Exam    Airway   Mallampati: I       Cardiovascular - normal exam     Dental - normal exam         Pulmonary - normal exam     Abdominal - normal exam     Neurological - normal exam                 Anesthesia Plan    ASA 3- EMERGENT   ASA physical status 3 criteria: alcohol and/or substance dependence or abuseASA physical status emergent criteria: compromised vital organ, limb or tissue    Plan - general       Airway plan will be LMA        Induction: intravenous    Postoperative Plan: Postoperative administration of opioids is intended.    Pertinent diagnostic labs and testing reviewed    Informed Consent:    Anesthetic plan and risks discussed with patient.        "

## 2019-09-08 NOTE — ANESTHESIA QCDR
2019 [facility] Clinical Data Registry (for Quality Improvement)     Postoperative nausea/vomiting risk protocol (Adult = 18 yrs and Pediatric 3-17 yrs)- (430 and 463)  General inhalation anesthetic (NOT TIVA) with PONV risk factors: Yes  Provision of anti-emetic therapy with at least 2 different classes of agents: Yes   Patient DID NOT receive anti-emetic therapy and reason is documented in Medical Record:  N/A    Multimodal Pain Management- (AQI59)  Patient undergoing Elective Surgery (i.e. Outpatient, or ASC, or Prescheduled Surgery prior to Hospital Admission): No  Use of Multimodal Pain Management, two or more drugs and/or interventions, NOT including systemic opioids: N/A  Exception: Documented allergy to multiple classes of analgesics: N/A    PACU assessment of acute postoperative pain prior to Anesthesia Care End- Applies to Patients Age = 18- (ABG7)  Initial PACU pain score is which of the following: < 7/10  Patient unable to report pain score: N/A    Post-anesthetic transfer of care checklist/protocol to PACU/ICU- (426 and 427)  Upon conclusion of case, patient transferred to which of the following locations: PACU/Non-ICU  Use of transfer checklist/protocol: Yes  Exclusion: Service Performed in Patient Hospital Room (and thus did not require transfer): N/A    PACU Reintubation- (AQI31)  General anesthesia requiring endotracheal intubation (ETT) along with subsequent extubation in OR or PACU: Yes  Required reintubation in the PACU: No   Extubation was a planned trial documented in the medical record prior to removal of the original airway device:  N/A    Unplanned admission to ICU related to anesthesia service up through end of PACU care- (MD51)  Unplanned admission to ICU (not initially anticipated at anesthesia start time): No

## 2019-09-08 NOTE — H&P
Hospital Medicine History & Physical Note    Date of Service  9/8/2019    Primary Care Physician  Pcp Pt States None    Consultants  Urology    Code Status  Full    Chief Complaint  Left flank pain    History of Presenting Illness  44 y.o. female who presented 9/8/2019 with left flank pain which started about 2 days ago.  She had associated symptoms of chills and nausea, she denies any vomiting or diarrhea or dysuria.  She did notice urinary frequency and dark urine.  She has had no fever.  Denies any chest pain palpitation shortness of breath diaphoresis.  She was seen outside facility were urinalysis shows urinary tract infection, CT scan showed a left ureterolithiasis with hydronephrosis.  Her white count was 19,000.  Her blood culture appears to be showing gram-negative rods.  She was told to be tachycardic.  Urology was consulted on the case. Denies any history of kidney stones in the past.  He was transferred here for urology consult.  I discussed the case with Urology on her arrival here.  History and exam was completely done with female staff present.    Review of Systems  Review of Systems   Constitutional: Positive for chills. Negative for diaphoresis, fever and malaise/fatigue.   HENT: Negative for hearing loss and sore throat.    Eyes: Negative for blurred vision.   Respiratory: Negative for cough and shortness of breath.    Cardiovascular: Negative for chest pain, palpitations and leg swelling.   Gastrointestinal: Positive for nausea. Negative for abdominal pain, diarrhea, heartburn and vomiting.   Genitourinary: Positive for flank pain and frequency. Negative for dysuria and hematuria.   Musculoskeletal: Negative for back pain and neck pain.   Skin: Negative for rash.   Neurological: Negative for dizziness and headaches.   Psychiatric/Behavioral: The patient is nervous/anxious.        Past Medical History   has a past medical history of Anesthesia, Arthritis, ASTHMA, Depression, Fall, GERD  (gastroesophageal reflux disease), Heart burn, Myocardial infarct (HCC), Pain, Psychiatric problem, Seizure (HCC), Seizure (HCC), Sleep apnea, Sleep apnea, Stroke (HCC), and Stroke (HCC). She also has no past medical history of Arrhythmia, Cancer (HCC), Cold, Dental disorder, Heart valve disease, Hepatitis A, Hepatitis B, Hepatitis C, Hypertension, Other specified symptom associated with female genital organs, Personal history of venous thrombosis and embolism, Pregnancy, or Unspecified hemorrhagic conditions.    Surgical History   has a past surgical history that includes other orthopedic surgery; other; pr  delivery only; and hysterectomy robotic (2013).     Family History  Family history is unknown by patient.     Social History   reports that she has been smoking cigarettes. She has a 5.00 pack-year smoking history. She has never used smokeless tobacco. She reports that she drinks about 3.5 oz of alcohol per week. She reports that she has current or past drug history. Drug: Marijuana.    Allergies  Allergies   Allergen Reactions   • Pcn [Penicillins] Shortness of Breath and Swelling   • Pcn [Penicillins]    • Trazodone Hives   • Trazodone      Hives         Medications  Prior to Admission Medications   Prescriptions Last Dose Informant Patient Reported? Taking?   Diclofenac Sodium (VOLTAREN) 1 % GEL   Yes No   Sig: Apply  to skin as directed.   albuterol (PROVENTIL) 2.5 mg/0.5 mL NEBU   Yes No   Sig: Inhale  by mouth.   albuterol 108 (90 Base) MCG/ACT Aero Soln inhalation aerosol   No No   Sig: Inhale 1-2 Puffs by mouth every four hours as needed for Shortness of Breath (wheeze, cough).   buPROPion (WELLBUTRIN XL) 150 MG XL tablet   No No   Sig: Take 1 Tab by mouth every morning.   buPROPion SR (WELLBUTRIN SR) 150 MG TABLET SR 12 HR sustained-release tablet   Yes No   Sig: Take 150 mg by mouth 2 times a day.   carBAMazepine (TEGRETOL) 200 MG Tab   No No   Sig: Take 1 Tab by mouth 2 Times a Day.    cefUROXime (CEFTIN) 500 MG Tab   No No   Sig: Take 1 Tab by mouth 2 times a day.   gabapentin (NEURONTIN) 300 MG Cap   No No   Sig: Take 3 Caps by mouth 3 times a day. Indications: Fibromyalgia Syndrome   ibuprofen (MOTRIN) 800 MG TABS   Yes No   Sig: Take 800 mg by mouth every 8 hours as needed.      Facility-Administered Medications: None       Physical Exam  Temp:  [37.2 °C (99 °F)] 37.2 °C (99 °F)  Pulse:  [112] 112  Resp:  [18] 18  BP: (111)/(68) 111/68  SpO2:  [100 %] 100 %    Physical Exam   Constitutional: She is oriented to person, place, and time. She appears well-developed and well-nourished.   HENT:   Head: Normocephalic and atraumatic.   Eyes: Pupils are equal, round, and reactive to light. Conjunctivae are normal.   Neck: No tracheal deviation present. No thyromegaly present.   Cardiovascular: Normal rate and regular rhythm.   Pulmonary/Chest: Effort normal and breath sounds normal.   Abdominal: Soft. Bowel sounds are normal. She exhibits no distension. There is tenderness (LLQ). There is no rebound and no guarding.   Genitourinary:   Genitourinary Comments: Left CVA tenderness   Musculoskeletal: She exhibits no edema.   Lymphadenopathy:     She has no cervical adenopathy.   Neurological: She is alert and oriented to person, place, and time.   Skin: Skin is warm and dry.   Nursing note and vitals reviewed.      Laboratory:  Recent Labs     09/07/19  2230   WBC 19.1*   RBC 4.81   HEMOGLOBIN 15.4   HEMATOCRIT 44.2   MCV 91.9   MCH 32.0   MCHC 34.8   RDW 12.1   PLATELETCT 383   MPV 9.3     Recent Labs     09/07/19  2230   SODIUM 134*   POTASSIUM 3.2*   CHLORIDE 99   CO2 25   GLUCOSE 114*   BUN 12   CREATININE 0.7   CALCIUM 9.0     Recent Labs     09/07/19  2230   ALTSGPT 19   ASTSGOT 10   ALKPHOSPHAT 90   TBILIRUBIN 1.0   LIPASE 40*   GLUCOSE 114*     Recent Labs     09/08/19  1230   INR 1.34*     No results for input(s): NTPROBNP in the last 72 hours.      No results for input(s): TROPONINT in the last  72 hours.    Urinalysis:    Recent Labs     09/08/19  0030   SPECGRAVITY <=1.005*   GLUCOSEUR NEGATIVE   KETONES TRACE*   NITRITE POSITIVE*   LEUKESTERAS SMALL*   WBCURINE 50-75*   RBCURINE 6-10   BACTERIA *   EPITHELCELL 3-5*        Imaging:  DX-CYSTO FLUORO > 1 HOUR    (Results Pending)         Assessment/Plan:  I anticipate this patient will require at least two midnights for appropriate medical management, necessitating inpatient admission.    * Pyelonephritis- (present on admission)  Assessment & Plan  IVF hydration  IV Rocephin  Follow culture    Gram-negative bacteremia- (present on admission)  Assessment & Plan  IV Rocephin  Follow culture    Ureteral stone with hydronephrosis- (present on admission)  Assessment & Plan  Urology consulted  IVF hydration  Pain control    Sepsis (HCC)- (present on admission)  Assessment & Plan  This is sepsis (without associated acute organ dysfunction).  Source -pyelonephritis    Seizure (HCC)- (present on admission)  Assessment & Plan  Continue carbamazepine and Neurontin    Alcohol use- (present on admission)  Assessment & Plan  Watch for withdrawal symptoms    Hypokalemia- (present on admission)  Assessment & Plan  Check BMP, magnesium, phosphorus    Polysubstance abuse (HCC)- (present on admission)  Assessment & Plan  Watch for withdrawal symptoms    Psychiatric disorder- (present on admission)  Assessment & Plan  Continue Wellbutrin    Tobacco use- (present on admission)  Assessment & Plan  Refused nicotine replacement protocol      VTE prophylaxis: SCD

## 2019-09-09 VITALS
HEIGHT: 68 IN | TEMPERATURE: 98.9 F | HEART RATE: 106 BPM | SYSTOLIC BLOOD PRESSURE: 124 MMHG | BODY MASS INDEX: 21.55 KG/M2 | DIASTOLIC BLOOD PRESSURE: 87 MMHG | RESPIRATION RATE: 18 BRPM | OXYGEN SATURATION: 99 % | WEIGHT: 142.2 LBS

## 2019-09-09 LAB
ANION GAP SERPL CALC-SCNC: 7 MMOL/L (ref 0–11.9)
BASOPHILS # BLD AUTO: 0 % (ref 0–1.8)
BASOPHILS # BLD: 0 K/UL (ref 0–0.12)
BUN SERPL-MCNC: 17 MG/DL (ref 8–22)
CALCIUM SERPL-MCNC: 8.1 MG/DL (ref 8.5–10.5)
CHLORIDE SERPL-SCNC: 108 MMOL/L (ref 96–112)
CO2 SERPL-SCNC: 22 MMOL/L (ref 20–33)
CREAT SERPL-MCNC: 0.72 MG/DL (ref 0.5–1.4)
EOSINOPHIL # BLD AUTO: 0 K/UL (ref 0–0.51)
EOSINOPHIL NFR BLD: 0 % (ref 0–6.9)
ERYTHROCYTE [DISTWIDTH] IN BLOOD BY AUTOMATED COUNT: 43.1 FL (ref 35.9–50)
GLUCOSE SERPL-MCNC: 147 MG/DL (ref 65–99)
HCT VFR BLD AUTO: 34.8 % (ref 37–47)
HGB BLD-MCNC: 11.8 G/DL (ref 12–16)
LYMPHOCYTES # BLD AUTO: 0.81 K/UL (ref 1–4.8)
LYMPHOCYTES NFR BLD: 4.3 % (ref 22–41)
MANUAL DIFF BLD: NORMAL
MCH RBC QN AUTO: 32.5 PG (ref 27–33)
MCHC RBC AUTO-ENTMCNC: 33.9 G/DL (ref 33.6–35)
MCV RBC AUTO: 95.9 FL (ref 81.4–97.8)
MONOCYTES # BLD AUTO: 0.17 K/UL (ref 0–0.85)
MONOCYTES NFR BLD AUTO: 0.9 % (ref 0–13.4)
MORPHOLOGY BLD-IMP: NORMAL
NEUTROPHILS # BLD AUTO: 17.92 K/UL (ref 2–7.15)
NEUTROPHILS NFR BLD: 88.7 % (ref 44–72)
NEUTS BAND NFR BLD MANUAL: 6.1 % (ref 0–10)
NRBC # BLD AUTO: 0 K/UL
NRBC BLD-RTO: 0 /100 WBC
PLATELET # BLD AUTO: 247 K/UL (ref 164–446)
PLATELET BLD QL SMEAR: NORMAL
PMV BLD AUTO: 9.9 FL (ref 9–12.9)
POTASSIUM SERPL-SCNC: 3.4 MMOL/L (ref 3.6–5.5)
RBC # BLD AUTO: 3.63 M/UL (ref 4.2–5.4)
RBC BLD AUTO: NORMAL
SODIUM SERPL-SCNC: 137 MMOL/L (ref 135–145)
WBC # BLD AUTO: 18.9 K/UL (ref 4.8–10.8)

## 2019-09-09 PROCEDURE — 700111 HCHG RX REV CODE 636 W/ 250 OVERRIDE (IP): Performed by: INTERNAL MEDICINE

## 2019-09-09 PROCEDURE — 99233 SBSQ HOSP IP/OBS HIGH 50: CPT | Performed by: INTERNAL MEDICINE

## 2019-09-09 PROCEDURE — A9270 NON-COVERED ITEM OR SERVICE: HCPCS | Performed by: INTERNAL MEDICINE

## 2019-09-09 PROCEDURE — 700102 HCHG RX REV CODE 250 W/ 637 OVERRIDE(OP): Performed by: FAMILY MEDICINE

## 2019-09-09 PROCEDURE — 94760 N-INVAS EAR/PLS OXIMETRY 1: CPT

## 2019-09-09 PROCEDURE — 700102 HCHG RX REV CODE 250 W/ 637 OVERRIDE(OP): Performed by: INTERNAL MEDICINE

## 2019-09-09 PROCEDURE — A9270 NON-COVERED ITEM OR SERVICE: HCPCS | Performed by: FAMILY MEDICINE

## 2019-09-09 PROCEDURE — 700105 HCHG RX REV CODE 258: Performed by: FAMILY MEDICINE

## 2019-09-09 PROCEDURE — 700111 HCHG RX REV CODE 636 W/ 250 OVERRIDE (IP): Performed by: FAMILY MEDICINE

## 2019-09-09 RX ORDER — TRAMADOL HYDROCHLORIDE 50 MG/1
50 TABLET ORAL EVERY 6 HOURS PRN
Status: DISCONTINUED | OUTPATIENT
Start: 2019-09-09 | End: 2019-09-10 | Stop reason: HOSPADM

## 2019-09-09 RX ADMIN — BUPROPION HYDROCHLORIDE 150 MG: 150 TABLET, EXTENDED RELEASE ORAL at 04:49

## 2019-09-09 RX ADMIN — TRAMADOL HYDROCHLORIDE 50 MG: 50 TABLET, FILM COATED ORAL at 17:29

## 2019-09-09 RX ADMIN — KETOROLAC TROMETHAMINE 30 MG: 30 INJECTION, SOLUTION INTRAMUSCULAR at 09:44

## 2019-09-09 RX ADMIN — GABAPENTIN 800 MG: 400 CAPSULE ORAL at 11:26

## 2019-09-09 RX ADMIN — TRAMADOL HYDROCHLORIDE 50 MG: 50 TABLET, FILM COATED ORAL at 11:26

## 2019-09-09 RX ADMIN — SENNOSIDES, DOCUSATE SODIUM 2 TABLET: 50; 8.6 TABLET, FILM COATED ORAL at 04:50

## 2019-09-09 RX ADMIN — ACETAMINOPHEN 650 MG: 325 TABLET, FILM COATED ORAL at 02:24

## 2019-09-09 RX ADMIN — GABAPENTIN 800 MG: 400 CAPSULE ORAL at 17:29

## 2019-09-09 RX ADMIN — BUPROPION HYDROCHLORIDE 150 MG: 150 TABLET, EXTENDED RELEASE ORAL at 17:29

## 2019-09-09 RX ADMIN — CARBAMAZEPINE 200 MG: 200 TABLET ORAL at 04:50

## 2019-09-09 RX ADMIN — CARBAMAZEPINE 200 MG: 200 TABLET ORAL at 17:29

## 2019-09-09 RX ADMIN — ENOXAPARIN SODIUM 40 MG: 100 INJECTION SUBCUTANEOUS at 11:26

## 2019-09-09 RX ADMIN — CEFTRIAXONE SODIUM 2 G: 2 INJECTION, POWDER, FOR SOLUTION INTRAMUSCULAR; INTRAVENOUS at 04:50

## 2019-09-09 RX ADMIN — GABAPENTIN 800 MG: 400 CAPSULE ORAL at 04:50

## 2019-09-09 RX ADMIN — SODIUM CHLORIDE, POTASSIUM CHLORIDE, SODIUM LACTATE AND CALCIUM CHLORIDE: 600; 310; 30; 20 INJECTION, SOLUTION INTRAVENOUS at 05:53

## 2019-09-09 ASSESSMENT — ENCOUNTER SYMPTOMS
FLANK PAIN: 1
MYALGIAS: 0
SEIZURES: 0
ORTHOPNEA: 0
NECK PAIN: 0
STRIDOR: 0
EYE REDNESS: 0
FOCAL WEAKNESS: 0
VOMITING: 0
HEADACHES: 0
INSOMNIA: 0
EYE PAIN: 0
WEIGHT LOSS: 0
EYE DISCHARGE: 0
SPUTUM PRODUCTION: 0
BLURRED VISION: 0
ABDOMINAL PAIN: 0
NERVOUS/ANXIOUS: 0
HEARTBURN: 0
NAUSEA: 0
PALPITATIONS: 0
DIARRHEA: 0
DIZZINESS: 0
COUGH: 0
SHORTNESS OF BREATH: 0
BACK PAIN: 0
DEPRESSION: 0
CHILLS: 0
FEVER: 0

## 2019-09-09 ASSESSMENT — COPD QUESTIONNAIRES
DO YOU EVER COUGH UP ANY MUCUS OR PHLEGM?: NO/ONLY WITH OCCASIONAL COLDS OR INFECTIONS
COPD SCREENING SCORE: 2
DURING THE PAST 4 WEEKS HOW MUCH DID YOU FEEL SHORT OF BREATH: NONE/LITTLE OF THE TIME
HAVE YOU SMOKED AT LEAST 100 CIGARETTES IN YOUR ENTIRE LIFE: YES

## 2019-09-09 ASSESSMENT — LIFESTYLE VARIABLES: EVER_SMOKED: YES

## 2019-09-09 NOTE — CARE PLAN
Problem: Communication  Goal: The ability to communicate needs accurately and effectively will improve  Outcome: PROGRESSING AS EXPECTED  Pt communicates needs appropriately      Problem: Infection  Goal: Will remain free from infection  Outcome: PROGRESSING AS EXPECTED  Pt's labs and VSS being monitored, IV abx running, standardized handwashing performed per hospital protocol

## 2019-09-09 NOTE — DISCHARGE PLANNING
Care Transition Team Assessment      Anticipated Discharge Disposition: Shelter    Action: RN CM assessed pt at bedside. Pt reports she is homeless, currently living in [place] area with her boyfriend. Pt has a mailing address of [place] where her mother and children are living.   Pt reports she is independent with all her ADLs and IADLs; she states she drives but her car does not have working tail lights so she cannot use it until they are fixed. Pt reports she will need assistance getting back to [place].   Pt states she has no PCP at this time. She has no prescription drug coverage.  Pt is upset because she states she has no clothing with her; she is unsure where her clothing is at this time.     Barriers to Discharge: Medical clearance    Plan: RN CM will assist with discharge planning needs upon receipt of medical clearance.       Information Source  Orientation : Oriented x 4  Information Given By: Patient  Who is responsible for making decisions for patient? : Patient    Readmission Evaluation  Is this a readmission?: No    Elopement Risk  Legal Hold: No  Ambulatory or Self Mobile in Wheelchair: Yes  Disoriented: No  Psychiatric Symptoms: None  History of Wandering: No  Elopement this Admit: No  Vocalizing Wanting to Leave: No  Displays Behaviors, Body Language Wanting to Leave: No-Not at Risk for Elopement  Elopement Risk: Not at Risk for Elopement    Interdisciplinary Discharge Planning  Does Admitting Nurse Feel This Could be a Complex Discharge?: Yes  Primary Care Physician: no PCP currently  Lives with - Patient's Self Care Capacity: Unrelated Adult  Patient or legal guardian wants to designate a caregiver (see row info): No  Support Systems: Spouse / Significant Other  Housing / Facility: Homeless  Do You Take your Prescribed Medications Regularly: No  Reasons Why Not Taking Medications : Financial Reasons  Able to Return to Previous ADL's: Yes  Mobility Issues: No  Prior Services:  None  Patient Expects to be Discharged to:: home  Assistance Needed: Unknown at this Time  Durable Medical Equipment: Not Applicable    Discharge Preparedness  What is your plan after discharge?: Uncertain - pending medical team collaboration  What are your discharge supports?: Partner  Prior Functional Level: Ambulatory, Drives Self, Independent with Activities of Daily Living, Independent with Medication Management  Difficulity with ADLs: None  Difficulity with IADLs: None    Functional Assesment  Prior Functional Level: Ambulatory, Drives Self, Independent with Activities of Daily Living, Independent with Medication Management    Finances  Financial Barriers to Discharge: Yes  Prescription Coverage: No    Vision / Hearing Impairment  Vision Impairment : No  Hearing Impairment : No         Advance Directive  Advance Directive?: None    Domestic Abuse  Have you ever been the victim of abuse or violence?: Refused  Physical Abuse or Sexual Abuse: Unable to Assess due to Patient Condition(won't answer at this time)  Verbal Abuse or Emotional Abuse: Unable to Assess due to Patient Condition  Possible Abuse Reported to:: Not Applicable    Psychological Assessment  History of Substance Abuse: Amphetamines    Discharge Risks or Barriers  Discharge risks or barriers?: No PCP, Substance abuse, Transportation, Uninsured / underinsured, Homeless / couch surfing  Patient risk factors: Substance abuse, No PCP, Homeless, Lack of outside supports, Uninsured or underinsured    Anticipated Discharge Information  Anticipated discharge disposition: Shelter  Discharge Address: TBD  Discharge Contact Phone Number: [phone]

## 2019-09-09 NOTE — PROGRESS NOTES
Hospital Medicine Daily Progress Note    Date of Service  9/9/2019    Chief Complaint  44 y.o. female admitted 9/8/2019 with left flank pain    Hospital Course    43 y/o F with PMH of psychiatric disorder, here with flank pain and found to have left UPJ stone with hydronephrosis.       Interval Problem Update  POD 1 cystoscopy and ureteroscopy. Her significant other is trying to complaint about that he was being searched last night and not allowed to sleep with her in her room.  On IV abx. Still complain about left flank pain. Patient was seen and examined by me today. Case was discussed with RN and multidisplinary team during rounds. Denies nausea, vomiting, diarrhea.       Consultants/Specialty  urology    Code Status  full    Disposition  Remain on the floor    Review of Systems  Review of Systems   Constitutional: Negative for chills, fever and weight loss.   HENT: Negative for congestion and nosebleeds.    Eyes: Negative for blurred vision, pain, discharge and redness.   Respiratory: Negative for cough, sputum production, shortness of breath and stridor.    Cardiovascular: Negative for chest pain, palpitations and orthopnea.   Gastrointestinal: Negative for abdominal pain, diarrhea, heartburn, nausea and vomiting.   Genitourinary: Positive for flank pain. Negative for dysuria, frequency and urgency.   Musculoskeletal: Negative for back pain, myalgias and neck pain.   Skin: Negative for itching and rash.   Neurological: Negative for dizziness, focal weakness, seizures and headaches.   Psychiatric/Behavioral: Negative for depression. The patient is not nervous/anxious and does not have insomnia.         Physical Exam  Temp:  [36.4 °C (97.5 °F)-37.4 °C (99.4 °F)] 36.5 °C (97.7 °F)  Pulse:  [] 96  Resp:  [8-31] 18  BP: ()/(43-70) 119/67  SpO2:  [92 %-100 %] 99 %    Physical Exam   Constitutional: She is oriented to person, place, and time. No distress.   HENT:   Head: Normocephalic and atraumatic.    Mouth/Throat: Oropharynx is clear and moist.   Eyes: Pupils are equal, round, and reactive to light. Conjunctivae and EOM are normal.   Neck: Normal range of motion. Neck supple. No tracheal deviation present. No thyromegaly present.   Cardiovascular: Normal rate and regular rhythm.   No murmur heard.  Pulmonary/Chest: Effort normal and breath sounds normal. No respiratory distress. She has no wheezes.   Abdominal: Soft. Bowel sounds are normal. She exhibits no distension. There is no tenderness.   Musculoskeletal: She exhibits no edema or tenderness.   Neurological: She is alert and oriented to person, place, and time. No cranial nerve deficit.   Skin: Skin is warm and dry. She is not diaphoretic. No erythema.   Psychiatric: She has a normal mood and affect. Her behavior is normal. Thought content normal.   Nursing note and vitals reviewed.      Fluids    Intake/Output Summary (Last 24 hours) at 9/9/2019 0714  Last data filed at 9/9/2019 0400  Gross per 24 hour   Intake 1880 ml   Output 750 ml   Net 1130 ml       Laboratory  Recent Labs     09/07/19 2230 09/08/19  2340   WBC 19.1* 18.9*   RBC 4.81 3.63*   HEMOGLOBIN 15.4 11.8*   HEMATOCRIT 44.2 34.8*   MCV 91.9 95.9   MCH 32.0 32.5   MCHC 34.8 33.9   RDW 12.1 43.1   PLATELETCT 383 247   MPV 9.3 9.9     Recent Labs     09/07/19 2230 09/08/19 2016 09/08/19  2340   SODIUM 134* 137 137   POTASSIUM 3.2* 3.3* 3.4*   CHLORIDE 99 106 108   CO2 25 21 22   GLUCOSE 114* 159* 147*   BUN 12 14 17   CREATININE 0.7 0.78 0.72   CALCIUM 9.0 7.9* 8.1*     Recent Labs     09/08/19  1230   INR 1.34*               Imaging  DX-CYSTO FLUORO > 1 HOUR   Final Result    Intraoperative image as above described.         INTERPRETING LOCATION: [address] [place] [address] [place], [id number]           Assessment/Plan  * Pyelonephritis- (present on admission)  Assessment & Plan  IVF hydration  IV Rocephin  Follow culture: [employer]    Gram-negative bacteremia- (present on admission)  Assessment & Plan  IV  Rocephin  Follow culture  Will consult ID once culture is finalized    Ureteral stone with hydronephrosis- (present on admission)  Assessment & Plan  Urology consulted POD 1  CYSTOSCOPY,  * URETEROSCOPY   On IV ceftriaxone  Follow culture  Pain control with toradol            Sepsis (HCC)- (present on admission)  Assessment & Plan  This is sepsis (without associated acute organ dysfunction).  Source -pyelonephritis  Sepsis protocol  IV ceftriaxone  Follow culture    Seizure (HCC)- (present on admission)  Assessment & Plan  Continue carbamazepine and Neurontin    Alcohol use- (present on admission)  Assessment & Plan  Watch for withdrawal symptoms    Hypokalemia- (present on admission)  Assessment & Plan  Check BMP, magnesium, phosphorus    Polysubstance abuse (HCC)- (present on admission)  Assessment & Plan  History of  Watch for withdrawal symptoms  Be very careful with pain meds    Psychiatric disorder- (present on admission)  Assessment & Plan  Continue Wellbutrin    Tobacco use- (present on admission)  Assessment & Plan  Refused nicotine replacement protocol       VTE prophylaxis: lovenox

## 2019-09-09 NOTE — PROGRESS NOTES
"2 RN skin check complete with ALTAGRACIA Ibarra.   Devices in place: N/A.  Skin assessed under devices: N/A.  Confirmed pressure ulcers found on:N/A  New potential pressure ulcers noted on: N/A.   Wound consult placed: N/A    Scattered tattoos on body. Left forearm small circular scabbing noted. Right shin circular bruising, old. Per patient resulting from \"a fall when she was little.\" Dry, cracked skin on heels. No other skin demarcations/issues noted/reported.     The following interventions in place: Pillows for support/positioning.   "

## 2019-09-09 NOTE — PROGRESS NOTES
Pt A&O x's 4, VSS, pain is a 7/10 per pain scale, medicated per MAR. Pt is on RA, denies any SOB or CP. Pt is tolerating diet, denies any N/V/D, abdomen soft and tender, +BM, +flatus, +tea colored urine OP, dysuria with urination. Pt's boyfriend expressed concerns over his rights to visit pt and that he feels his rights have been violated d/t his belongings being searched. Management, charge nurse, MD and security all aware. POC discussed with pt and boyfriend, all questions and concerns have been addressed. Bed in locked, lowest position, call light and personal items within reach.

## 2019-09-09 NOTE — PROGRESS NOTES
Received report from AM RN; assumed care. 2 RN skin check performed at change of shift, see previous PN. A&O x 4. VSS, previously tachycardiac, improved this shift. 92% on RA, 0.5L NC with humidification provided d/t sinus/airway dryness. Patient denied pain medication needs. Dysuria reported with urination. Tea colored/malodorous urine noted with voiding, quantity sufficient. Scant blood noted in urine. Mesh underwear/pads provided. Educated patient normal findings after cystoscopy. Verbalized understanding. Abdomen soft, tender. Low back pain reported, patient choosing repositioning over medications. Patient tolerating diet, snacking frequently. + void. + flatus. LBM PTA.     Patient requested that boyfriend spend the night. Permission granted if boyfriend allow security search belongings and not allowed to leave unit at night d/t patients previous drug use history. Boyfriend arrived, initially irritable and refusing to have belongings searched, then nearly compliant after education, handing over marijuana pen to be placed in medication drawer, then changed his mind to have his mother  belongings rather than have them searched. Boyfriend stated he'd return in 30 minutes. He did not. Charge RN notified.    POC discussed. Questions answered. Bed locked/lowest position. Call light/personal belongings within reach. All needs met/patient sleeping at present time.

## 2019-09-09 NOTE — PROGRESS NOTES
PACU and tele report received. Patient resting comfortably in bed. NO current complaints. When patient more awake we will inform her on our policy for visitors. CHarge RN, NAM, and Security all aware of patient's background. Will update NOC RN. All belongings from pacu and Tele at bedside. No concerns at this time

## 2019-09-09 NOTE — CARE PLAN
Problem: Communication  Goal: The ability to communicate needs accurately and effectively will improve  Outcome: PROGRESSING AS EXPECTED  Patient able to articulate needs. Patient either utilizes call light or comes to nursing station with questions.     Problem: Safety  Goal: Will remain free from injury  Outcome: PROGRESSING AS EXPECTED  Patient up self with steady gait. Patient educated about fall risk. Verbalized understanding.     Problem: Bowel/Gastric:  Goal: Normal bowel function is maintained or improved  Outcome: PROGRESSING AS EXPECTED  Patient up ambulating in room frequently. Patient tolerating diet, snacking frequently. Normoactive BS x 4 noted.     Problem: Urinary Elimination:  Goal: Ability to reestablish a normal urinary elimination pattern will improve  Outcome: PROGRESSING AS EXPECTED  Patient voiding quantity sufficient, malodorous urine noted. Expected s/p cystoscopy.

## 2019-09-10 NOTE — PROGRESS NOTES
Report received from day shift RN, assumed Care.   Patient is AOx4, responds appropriately, family at bedside.     Patient denies any pain at this time.   Patient is tolerating regular diet, denies nausea/vomiting. + flatus.   Up self with steady gait.    Plan of care discussed, all questions answered.    Educated on use of call light and importance of calling when assistance is required. Pt verbalizes understanding.    Call light and belongings within reach, treaded slipper socks on, bed in lowest locked position.  All needs met at this time.

## 2019-09-10 NOTE — PROGRESS NOTES
Patient stating she wishes to leave since her boyfriend cannot stay. Educated the patient on the importance of continuing her treatment, and if she is leaving it will be against medical advice. Patient adamant that she wishes to leave and will not be speaking to the doctor.     Hospitalist Dr. Stephen and Dr. Rose (on call for Dr. Smart) informed of patient's departure from the floor.

## 2019-09-10 NOTE — PROGRESS NOTES
Boyfriend attempting to visit patient at 2230. When visitor was informed he must be checked as well as his belongings, he became agitated and using curse words. Charge RN PJ informed and security called.     2237 Security at bedside informing patient and visitor of protocol. This RN and charge nurse informing security that nursing staff no longer wishes visitor to be allowed on unit as he is verbally abusive to staff and causing a scene in the hallway.

## 2019-09-10 NOTE — CARE PLAN
Problem: Knowledge Deficit  Goal: Knowledge of the prescribed therapeutic regimen will improve  9/9/2019 2245 by Rachael Russo R.N.  Outcome: PROGRESSING AS EXPECTED  Educate patient on plan of care and encourage to ask questions.      Problem: Pain Management  Goal: Pain level will decrease to patient's comfort goal  Outcome: PROGRESSING AS EXPECTED   Perform frequent pain assessment and medicate patient according to MAR.

## 2019-09-16 ENCOUNTER — HOSPITAL ENCOUNTER (OUTPATIENT)
Dept: RADIOLOGY | Facility: MEDICAL CENTER | Age: 44
End: 2019-09-16

## 2020-05-20 PROBLEM — N20.0 KIDNEY STONE: Status: ACTIVE | Noted: 2020-05-20

## 2020-05-20 PROBLEM — R10.9 FLANK PAIN: Status: ACTIVE | Noted: 2020-05-20

## 2020-05-20 PROBLEM — R35.0 FREQUENCY OF MICTURITION: Status: ACTIVE | Noted: 2020-05-20

## 2024-03-20 NOTE — ED NOTES
ERP at bedside.    The patient's blood pressure is 134/76.  I will continue olmesartan at 40 mg p.o. daily, and hydrochlorothiazide at 12.5 mg p.o. daily.  Both prescriptions were refilled for 1 year.  I have ordered a CBC, CMP, and a lipid profile.  These results will be discussed on the patient's next office visit.

## (undated) DEVICE — HEAD HOLDER JUNIOR/ADULT

## (undated) DEVICE — SUCTION INSTRUMENT YANKAUER BULBOUS TIP W/O VENT (50EA/CA)

## (undated) DEVICE — CONNECTOR HOSE NEPTUNE FOR CYSTO ROOM

## (undated) DEVICE — KIT ANESTHESIA W/CIRCUIT & 3/LT BAG W/FILTER (20EA/CA)

## (undated) DEVICE — SET EXTENSION WITH 2 PORTS (48EA/CA) ***PART #2C8610 IS A SUBSTITUTE*****

## (undated) DEVICE — TUBING CLEARLINK DUO-VENT - C-FLO (48EA/CA)

## (undated) DEVICE — SLEEVE, VASO, THIGH, MED

## (undated) DEVICE — KIT ROOM DECONTAMINATION

## (undated) DEVICE — PROTECTOR ULNA NERVE - (36PR/CA)

## (undated) DEVICE — SENSOR SPO2 NEO LNCS ADHESIVE (20/BX) SEE USER NOTES

## (undated) DEVICE — SODIUM CHL. IRRIGATION 0.9% 3000ML (4EA/CA 65CA/PF)

## (undated) DEVICE — GOWN SURGICAL X-LARGE ULTRA - FILM-REINFORCED (20/CA)

## (undated) DEVICE — COVER FOOT UNIVERSAL DISP. - (25EA/CA)

## (undated) DEVICE — NEPTUNE 4 PORT MANIFOLD - (20/PK)

## (undated) DEVICE — JELLY, KY 2 0Z STERILE

## (undated) DEVICE — GOWN WARMING STANDARD FLEX - (30/CA)

## (undated) DEVICE — WATER IRRIG. STER 3000 ML - (4/CA)

## (undated) DEVICE — SPONGE GAUZESTER 4 X 4 4PLY - (128PK/CA)

## (undated) DEVICE — TUBE CONNECT SUCTION CLEAR 120 X 1/4" (50EA/CA)"

## (undated) DEVICE — PACK CYSTOSCOPY III - (8/CA)

## (undated) DEVICE — BAG URODRAIN WITH TUBING - (20/CA)

## (undated) DEVICE — ELECTRODE 850 FOAM ADHESIVE - HYDROGEL RADIOTRNSPRNT (50/PK)

## (undated) DEVICE — CATHETER URET OPEN END 6FR (10EA/BX)

## (undated) DEVICE — GLOVE BIOGEL SZ 7.5 SURGICAL PF LTX - (50PR/BX 4BX/CA)

## (undated) DEVICE — SET IRRIGATION CYSTOSCOPY Y-TYPE L81 IN (20EA/CA)

## (undated) DEVICE — SET LEADWIRE 5 LEAD BEDSIDE DISPOSABLE ECG (1SET OF 5/EA)

## (undated) DEVICE — WATER IRRIG. STER. 1500 ML - (9/CA)

## (undated) DEVICE — MASK ANESTHESIA ADULT  - (100/CA)

## (undated) DEVICE — GOWN SURGEONS X-LARGE - DISP. (30/CA)

## (undated) DEVICE — LACTATED RINGERS INJ 1000 ML - (14EA/CA 60CA/PF)